# Patient Record
(demographics unavailable — no encounter records)

---

## 2024-12-04 NOTE — HISTORY OF PRESENT ILLNESS
[Worsening] : worsening [6] : an average pain level of 6/10 [Standing] : standing [Daily] : ~He/She~ states the symptoms seem to be occuring daily [Walking] : worsened by walking [Knee Flexion] : worsened with knee flexion [Knee Extension] : worsened with knee extension [Rest] : relieved by rest [de-identified] : 89-year-old female presents with bilateral knees.  She is status post left total knee replacement on 1/3/2024 with great improvement.  She has no complaints with her left knee.  However she has known osteoarthritic changes of the right knee and would like to discuss her options.  She did have a cortisone injection in May which helped until September.  She states her pain has returned and is more so an achiness at this time.  She is having instability in the knee at times.  She is not able to walk or stand for long distances without having significant pain in the right knee.  She would like to discuss her options in terms of the knee replacement surgery.  She is ambulating freely without any assistive devices.. Denies fevers, chills, chest pain, calf pain, shortness of breath.

## 2024-12-04 NOTE — REASON FOR VISIT
[Follow-Up Visit] : a follow-up visit for [FreeTextEntry2] : Left TKR 1/3/24. Right knee osteoarthritis

## 2024-12-04 NOTE — HISTORY OF PRESENT ILLNESS
[Worsening] : worsening [6] : an average pain level of 6/10 [Standing] : standing [Daily] : ~He/She~ states the symptoms seem to be occuring daily [Walking] : worsened by walking [Knee Flexion] : worsened with knee flexion [Knee Extension] : worsened with knee extension [Rest] : relieved by rest [de-identified] : 89-year-old female presents with bilateral knees.  She is status post left total knee replacement on 1/3/2024 with great improvement.  She has no complaints with her left knee.  However she has known osteoarthritic changes of the right knee and would like to discuss her options.  She did have a cortisone injection in May which helped until September.  She states her pain has returned and is more so an achiness at this time.  She is having instability in the knee at times.  She is not able to walk or stand for long distances without having significant pain in the right knee.  She would like to discuss her options in terms of the knee replacement surgery.  She is ambulating freely without any assistive devices.. Denies fevers, chills, chest pain, calf pain, shortness of breath.

## 2024-12-04 NOTE — END OF VISIT
[FreeTextEntry3] : I, Dr. Hall, personally performed the evaluation and management (E/M) services for this established patient who presents today with an exacerbation of an existing condition. That E/M includes conducting the clinically appropriate interval history &/or exam, assessing all new/exacerbated conditions, and establishing a new plan of care. Today, my JAVAN, Teja Mirza PA-C, was here to observe my evaluation and management service for this new problem/exacerbated condition and follow the plan of care established by me going forward.

## 2024-12-04 NOTE — DISCUSSION/SUMMARY
[Medication Risks Reviewed] : Medication risks reviewed [Surgical risks reviewed] : Surgical risks reviewed [de-identified] : The patient is an 89 year old woman with bone on bone arthritis of their Right Knee. Based upon the patients continued symptoms and failure to respond to 3 months of conservative treatment I have recommended a Right Total Knee Replacement for this patient. A long discussion took place with the patient describing what a total joint replacement is and what the procedure would entail. A Total Knee model, similar to the implant that will be used during the operation was utilized to demonstrate and to discuss the various bearing surfaces of the implants.   The benefits of surgery were discussed with the patient including the potential for improving their current clinical condition through operative intervention. Alternatives to surgical intervention including continued conservative management were also discussed in detail.   The hospitalization and post-operative care and rehabilitation were also discussed. The use of perioperative antibiotics and DVT prophylaxis were discussed. The risk, benefits and alternatives to a surgical intervention were discussed at length with the patient. The patient was also advised of risks related to the medical comorbidities and elevated body mass index (BMI). A lengthy discussion took place to review the most common complications including but not limited to: deep vein thrombosis, pulmonary embolus, heart attack, stroke, infection, wound breakdown, numbness, damage to nerves, tendon, muscles, arteries or other blood vessels, death and other possible complications from anesthesia. The patient was told that we will take steps to minimize these risks by using sterile technique, antibiotics and DVT prophylaxis when appropriate and follow the patient postoperatively in the office setting to monitor progress. The possibility of recurrent pain, no improvement in pain and actual worsening of pain were also discussed with the patient.   The discharge plan of care focused on the patient going home following surgery. I encouraged the patient to make the necessary arrangements to have someone stay with them when they are discharged home. Following discharge, a home care nurse will visit the patient. They will open your home care case and request home physical therapy services. Home physical therapy will commence following discharge provided it is appropriate and covered by their] health insurance benefit plan.   All questions were answered to the satisfaction of the patient. The treatment plan of care, as well as a model of a Total Knee equivalent to the one that will be used for their total joint replacement, was shared with the patient. The patient agreed to the plan of care as well as the use of implants in their total joint replacement.   The patient was advised that they will require a medical preoperative risk evaluation by their PCP. Further medical subspecialty clearances such as cardiac may be indicated if felt needed by their PCP.   The patient participated in an interactive discussion of the TKR implant planned for their surgery with questions answered, agreed with the treatment plan, and has decided to move forward with elective TKR as planned.   A DJO Knee implant is the implant I feel comfortable utilizing in my Primary TKRs and the implant I am planning for their TKR. If the patient wishes to utilize a different implant brand/type for their TKR, they may obtain an additional surgical opinion from a Surgeon utilizing that brand implant

## 2024-12-04 NOTE — DISCUSSION/SUMMARY
[Medication Risks Reviewed] : Medication risks reviewed [Surgical risks reviewed] : Surgical risks reviewed [de-identified] : The patient is an 89 year old woman with bone on bone arthritis of their Right Knee. Based upon the patients continued symptoms and failure to respond to 3 months of conservative treatment I have recommended a Right Total Knee Replacement for this patient. A long discussion took place with the patient describing what a total joint replacement is and what the procedure would entail. A Total Knee model, similar to the implant that will be used during the operation was utilized to demonstrate and to discuss the various bearing surfaces of the implants.   The benefits of surgery were discussed with the patient including the potential for improving their current clinical condition through operative intervention. Alternatives to surgical intervention including continued conservative management were also discussed in detail.   The hospitalization and post-operative care and rehabilitation were also discussed. The use of perioperative antibiotics and DVT prophylaxis were discussed. The risk, benefits and alternatives to a surgical intervention were discussed at length with the patient. The patient was also advised of risks related to the medical comorbidities and elevated body mass index (BMI). A lengthy discussion took place to review the most common complications including but not limited to: deep vein thrombosis, pulmonary embolus, heart attack, stroke, infection, wound breakdown, numbness, damage to nerves, tendon, muscles, arteries or other blood vessels, death and other possible complications from anesthesia. The patient was told that we will take steps to minimize these risks by using sterile technique, antibiotics and DVT prophylaxis when appropriate and follow the patient postoperatively in the office setting to monitor progress. The possibility of recurrent pain, no improvement in pain and actual worsening of pain were also discussed with the patient.   The discharge plan of care focused on the patient going home following surgery. I encouraged the patient to make the necessary arrangements to have someone stay with them when they are discharged home. Following discharge, a home care nurse will visit the patient. They will open your home care case and request home physical therapy services. Home physical therapy will commence following discharge provided it is appropriate and covered by their] health insurance benefit plan.   All questions were answered to the satisfaction of the patient. The treatment plan of care, as well as a model of a Total Knee equivalent to the one that will be used for their total joint replacement, was shared with the patient. The patient agreed to the plan of care as well as the use of implants in their total joint replacement.   The patient was advised that they will require a medical preoperative risk evaluation by their PCP. Further medical subspecialty clearances such as cardiac may be indicated if felt needed by their PCP.   The patient participated in an interactive discussion of the TKR implant planned for their surgery with questions answered, agreed with the treatment plan, and has decided to move forward with elective TKR as planned.   A DJO Knee implant is the implant I feel comfortable utilizing in my Primary TKRs and the implant I am planning for their TKR. If the patient wishes to utilize a different implant brand/type for their TKR, they may obtain an additional surgical opinion from a Surgeon utilizing that brand implant

## 2024-12-04 NOTE — PHYSICAL EXAM
[LE] : Sensory: Intact in bilateral lower extremities [DP] : dorsalis pedis 2+ and symmetric bilaterally [Normal RLE] : Right Lower Extremity: No scars, rashes, lesions, ulcers, skin intact [Normal LLE] : Left Lower Extremity: No scars, rashes, lesions, ulcers, skin intact [Normal Touch] : sensation intact for touch [Normal] : no peripheral adenopathy appreciated [de-identified] : B/L Knees: Skin is clean, dry, intact. Swelling: No significant swelling Effusion: No significant effusion Alignment: Neutral alignment Tenderness: None Incision: Well healed surgical incision on the left knee Skin Temp: Warm to touch ROM: Flexion: 120 deg.; Extension: 0 deg, on the left. 110 deg.; Extension: 0 deg, on the right Laxity A-P: Negative anteroposterior drawer Laxity M-L: Less than 5 degree laxity varus valgus stresses PF crepitus: None Sensation intact throughout the distal lower extremity Pulses: 2+ dorsalis pedis pulses Quad/Ham St: 5/5 [de-identified] : 3 views, AP, lateral, and sunrise radiographs of the left knee were performed today. There are stable interfaces between bone, cement, and implant in all 3 components. There is stable positioning of the femoral, tibial, and patellar components. There is equidistant spacing on each side of the tibial bearing. There is no fracture, foreign body, subsidement, osteolysis, or notable effusion. The patellar component is tracking centrally in the trochlear groove of the femoral component.  3 views, AP, lateral, and sunrise radiographs of the right knee were performed today in the [place] office. Grade 4 bone-on-bone cartilaginous wear in all 3 compartments. Subchondral sclerosis noted on both sides the T-F  joint. Osteophytes are noted in all three compartments.

## 2024-12-04 NOTE — PHYSICAL EXAM
[LE] : Sensory: Intact in bilateral lower extremities [DP] : dorsalis pedis 2+ and symmetric bilaterally [Normal RLE] : Right Lower Extremity: No scars, rashes, lesions, ulcers, skin intact [Normal LLE] : Left Lower Extremity: No scars, rashes, lesions, ulcers, skin intact [Normal Touch] : sensation intact for touch [Normal] : no peripheral adenopathy appreciated [de-identified] : B/L Knees: Skin is clean, dry, intact. Swelling: No significant swelling Effusion: No significant effusion Alignment: Neutral alignment Tenderness: None Incision: Well healed surgical incision on the left knee Skin Temp: Warm to touch ROM: Flexion: 120 deg.; Extension: 0 deg, on the left. 110 deg.; Extension: 0 deg, on the right Laxity A-P: Negative anteroposterior drawer Laxity M-L: Less than 5 degree laxity varus valgus stresses PF crepitus: None Sensation intact throughout the distal lower extremity Pulses: 2+ dorsalis pedis pulses Quad/Ham St: 5/5 [de-identified] : 3 views, AP, lateral, and sunrise radiographs of the left knee were performed today. There are stable interfaces between bone, cement, and implant in all 3 components. There is stable positioning of the femoral, tibial, and patellar components. There is equidistant spacing on each side of the tibial bearing. There is no fracture, foreign body, subsidement, osteolysis, or notable effusion. The patellar component is tracking centrally in the trochlear groove of the femoral component.  3 views, AP, lateral, and sunrise radiographs of the right knee were performed today in the [place] office. Grade 4 bone-on-bone cartilaginous wear in all 3 compartments. Subchondral sclerosis noted on both sides the T-F  joint. Osteophytes are noted in all three compartments.

## 2024-12-18 NOTE — HISTORY OF PRESENT ILLNESS
[de-identified] : Patient is a 89 year old F with a PMHx of HLD coming in for annual physical.  Patient reports that she feels well at this time and is currently tolerating statin.  Patient currently up to date with screenings.

## 2024-12-18 NOTE — HISTORY OF PRESENT ILLNESS
[de-identified] : Patient is a 89 year old F with a PMHx of HLD coming in for annual physical.  Patient reports that she feels well at this time and is currently tolerating statin.  Patient currently up to date with screenings.

## 2024-12-18 NOTE — HEALTH RISK ASSESSMENT
[Yes] : Yes [2 - 4 times a month (2 pts)] : 2-4 times a month (2 points) [1 or 2 (0 pts)] : 1 or 2 (0 points) [Never (0 pts)] : Never (0 points) [No] : In the past 12 months have you used drugs other than those required for medical reasons? No [0] : 2) Feeling down, depressed, or hopeless: Not at all (0) [PHQ-2 Negative - No further assessment needed] : PHQ-2 Negative - No further assessment needed [Patient reported mammogram was normal] : Patient reported mammogram was normal [Patient reported PAP Smear was normal] : Patient reported PAP Smear was normal [Patient reported bone density results were normal] : Patient reported bone density results were normal [Patient reported colonoscopy was normal] : Patient reported colonoscopy was normal [Audit-CScore] : 2 [[id number]] : 0 [MammogramDate] : 04/24 [PapSmearDate] : 01/14 [PapSmearComments] : had BSO [BoneDensityDate] : 01/20 [ColonoscopyDate] : 01/23

## 2024-12-18 NOTE — HEALTH RISK ASSESSMENT
[Yes] : Yes [2 - 4 times a month (2 pts)] : 2-4 times a month (2 points) [1 or 2 (0 pts)] : 1 or 2 (0 points) [Never (0 pts)] : Never (0 points) [No] : In the past 12 months have you used drugs other than those required for medical reasons? No [0] : 2) Feeling down, depressed, or hopeless: Not at all (0) [PHQ-2 Negative - No further assessment needed] : PHQ-2 Negative - No further assessment needed [Patient reported mammogram was normal] : Patient reported mammogram was normal [Patient reported PAP Smear was normal] : Patient reported PAP Smear was normal [Patient reported bone density results were normal] : Patient reported bone density results were normal [Patient reported colonoscopy was normal] : Patient reported colonoscopy was normal [Audit-CScore] : 2 [[id number]] : 0 [MammogramDate] : 04/24 [PapSmearDate] : 01/14 [PapSmearComments] : had BSO [BoneDensityDate] : 01/20 [ColonoscopyDate] : 01/23

## 2025-01-15 NOTE — HISTORY OF PRESENT ILLNESS
[No Pertinent Cardiac History] : no history of aortic stenosis, atrial fibrillation, coronary artery disease, recent myocardial infarction, or implantable device/pacemaker [No Pertinent Pulmonary History] : no history of asthma, COPD, sleep apnea, or smoking [No Adverse Anesthesia Reaction] : no adverse anesthesia reaction in self or family member [Moderate (4-6 METs)] : Moderate (4-6 METs) [Chronic Anticoagulation] : no chronic anticoagulation [Chronic Kidney Disease] : no chronic kidney disease [Diabetes] : no diabetes [(Patient denies any chest pain, claudication, dyspnea on exertion, orthopnea, palpitations or syncope)] : Patient denies any chest pain, claudication, dyspnea on exertion, orthopnea, palpitations or syncope [FreeTextEntry1] : TKR or right knee [FreeTextEntry2] : 2/5/25 [FreeTextEntry3] : Dr. Hall [FreeTextEntry4] : Patient is a 89 year old F with a PMHx of HLD coming in for pre op evaluation.  Patient reports increased stress due to death of pet and problems with family but otherwise feels well.

## 2025-02-24 NOTE — HISTORY OF PRESENT ILLNESS
[___ Weeks Post Op] : [unfilled] weeks post op [3] : the patient reports pain that is 3/10 in severity [Clean/Dry/Intact] : clean, dry and intact [Erythema] : erythematous [Swelling] : swollen [Neuro Intact] : an unremarkable neurological exam [Vascular Intact] : ~T peripheral vascular exam normal [Negative Bry's] : maneuvers demonstrated a negative Bry's sign [Xray (Date:___)] : [unfilled] Xray -  [Hardware in Good Position] : hardware in good position [No Obvious Fractures] : no obvious fractures [Good Overall Alignment] : good overall alignment [Doing Well] : is doing well [No Sign of Infection] : is showing no signs of infection [Adequate Pain Control] : has adequate pain control [Steri-Strips Removed & Replaced] : steri-strips removed and replaced [Chills] : no chills [Constipation] : no constipation [Diarrhea] : no diarrhea [Dysuria] : no dysuria [Fever] : no fever [Nausea] : no nausea [Vomiting] : no vomiting [Discharge] : absent of discharge [Dehiscence] : not dehisced [de-identified] : Status post right knee replacement done on 2/5/2025.  Patient is here for her first postop visit. [de-identified] : Alma Gonzalez is a 89-year-old female status post right total knee replacement done on 2/5/2025 by Dr. Ramos at [facility].  Patient was discharged home with home physical therapy.  She she was sent home with oxycodone for pain management.  Patient reports she has not used oxycodone at home.  She is on Celebrex twice a day to reduce inflammation.  She is taking Tylenol every 8 hours for analgesia.  Patient is taking aspirin twice a day for DVT prophylaxis.  Patient reports her pain is 3 out of 10.  Denies any fever chills shortness of breath nausea or vomiting.  Reports lower extremity and ankle swelling that resolves after elevation and rest.  Patient also reports she has been experiencing some muscle aches throughout her body.  Patient reports this is something new after her surgery. [de-identified] : Patient is alert oriented x 3 walks and using a walker with mild antalgic and slow gait.  Right knee incision clean dry and intact with Prineo tape in place.  Tape was removed to visualize the incision.  Incision is well-healing and approximated.  There is no dehiscence noted.  There is medial swelling compared to the lateral side.  There is no redness or signs and symptoms of infection or effusion.  Knee flexes to 100 degrees without any pain.  There is  no extension lag.  There is no AP or ML laxity.  Negative Homans' sign neurovascularly intact.  Mild lower extremity edema and good pedal pulses. [de-identified] : 3 views of the right knee were obtained at today's visit. X-rays reveal a well-positioned, well fixed total knee replacement in good alignment. There is no obvious evidence of fractures, dislocation or osteolysis. [de-identified] : S/P right knee replacement . Pt is healing well. [de-identified] : Patient is a 89-year-old female status post right knee replacement done on 2/5/2025 at [facility].  Patient is progressing well postoperatively.  X-ray results and physical examination findings discussed with the patient.  Patient to continue with icing resting and activity modification as discussed.  She would prefer to continue home therapy for the next 2 weeks and then start outpatient physical therapy. Request for both inpatient and outpatient therapy given to patient.  Patient to continue aspirin twice a day for the next 2 weeks for DVT prophylaxis.  She will continue with the Celebrex and Tylenol as directed.  Discussed Dr. Ramos's dental prophylaxis protocol and a prescription for amoxicillin was sent to pharmacy.  Discussed the muscle pain that she is experiencing throughout her body since the surgery.  Upon questioning patient is on a statin.  She has been on atorvastatin 10 mg/day for over a year.  But recently in the last 2 weeks it was increased to 20 mg/day.  Advised her symptoms could be from the statin, and to follow-up with her primary physician ASAP to discuss the symptoms.  Explained it is also important that she will check her electrolytes and stay hydrated.  Patient to follow-up with PMD ASAP.  All her questions and concerns were answered to her satisfaction.  Patient to follow-up with Dr. Ramos in 6 weeks. My cumulative time spent on this patient's visit included: Preparation for the visit, review of the medical records, review of pertinent diagnostic studies, examination and counseling of the patient on the above diagnosis, treatment plan and prognosis, orders of diagnostic tests, medications and/or appropriate procedures and documentation in the medical records of today's visit

## 2025-02-24 NOTE — HISTORY OF PRESENT ILLNESS
[___ Weeks Post Op] : [unfilled] weeks post op [3] : the patient reports pain that is 3/10 in severity [Clean/Dry/Intact] : clean, dry and intact [Erythema] : erythematous [Swelling] : swollen [Neuro Intact] : an unremarkable neurological exam [Vascular Intact] : ~T peripheral vascular exam normal [Negative Bry's] : maneuvers demonstrated a negative Bry's sign [Xray (Date:___)] : [unfilled] Xray -  [Hardware in Good Position] : hardware in good position [No Obvious Fractures] : no obvious fractures [Good Overall Alignment] : good overall alignment [Doing Well] : is doing well [No Sign of Infection] : is showing no signs of infection [Adequate Pain Control] : has adequate pain control [Steri-Strips Removed & Replaced] : steri-strips removed and replaced [Chills] : no chills [Constipation] : no constipation [Diarrhea] : no diarrhea [Dysuria] : no dysuria [Fever] : no fever [Nausea] : no nausea [Vomiting] : no vomiting [Discharge] : absent of discharge [Dehiscence] : not dehisced [de-identified] : Status post right knee replacement done on 2/5/2025.  Patient is here for her first postop visit. [de-identified] : Alma Gonzalez is a 89-year-old female status post right total knee replacement done on 2/5/2025 by Dr. Ramos at [facility].  Patient was discharged home with home physical therapy.  She she was sent home with oxycodone for pain management.  Patient reports she has not used oxycodone at home.  She is on Celebrex twice a day to reduce inflammation.  She is taking Tylenol every 8 hours for analgesia.  Patient is taking aspirin twice a day for DVT prophylaxis.  Patient reports her pain is 3 out of 10.  Denies any fever chills shortness of breath nausea or vomiting.  Reports lower extremity and ankle swelling that resolves after elevation and rest.  Patient also reports she has been experiencing some muscle aches throughout her body.  Patient reports this is something new after her surgery. [de-identified] : Patient is alert oriented x 3 walks and using a walker with mild antalgic and slow gait.  Right knee incision clean dry and intact with Prineo tape in place.  Tape was removed to visualize the incision.  Incision is well-healing and approximated.  There is no dehiscence noted.  There is medial swelling compared to the lateral side.  There is no redness or signs and symptoms of infection or effusion.  Knee flexes to 100 degrees without any pain.  There is  no extension lag.  There is no AP or ML laxity.  Negative Homans' sign neurovascularly intact.  Mild lower extremity edema and good pedal pulses. [de-identified] : 3 views of the right knee were obtained at today's visit. X-rays reveal a well-positioned, well fixed total knee replacement in good alignment. There is no obvious evidence of fractures, dislocation or osteolysis. [de-identified] : S/P right knee replacement . Pt is healing well. [de-identified] : Patient is a 89-year-old female status post right knee replacement done on 2/5/2025 at [facility].  Patient is progressing well postoperatively.  X-ray results and physical examination findings discussed with the patient.  Patient to continue with icing resting and activity modification as discussed.  She would prefer to continue home therapy for the next 2 weeks and then start outpatient physical therapy. Request for both inpatient and outpatient therapy given to patient.  Patient to continue aspirin twice a day for the next 2 weeks for DVT prophylaxis.  She will continue with the Celebrex and Tylenol as directed.  Discussed Dr. Ramos's dental prophylaxis protocol and a prescription for amoxicillin was sent to pharmacy.  Discussed the muscle pain that she is experiencing throughout her body since the surgery.  Upon questioning patient is on a statin.  She has been on atorvastatin 10 mg/day for over a year.  But recently in the last 2 weeks it was increased to 20 mg/day.  Advised her symptoms could be from the statin, and to follow-up with her primary physician ASAP to discuss the symptoms.  Explained it is also important that she will check her electrolytes and stay hydrated.  Patient to follow-up with PMD ASAP.  All her questions and concerns were answered to her satisfaction.  Patient to follow-up with Dr. Ramos in 6 weeks. My cumulative time spent on this patient's visit included: Preparation for the visit, review of the medical records, review of pertinent diagnostic studies, examination and counseling of the patient on the above diagnosis, treatment plan and prognosis, orders of diagnostic tests, medications and/or appropriate procedures and documentation in the medical records of today's visit

## 2025-04-08 NOTE — HISTORY OF PRESENT ILLNESS
[Chills] : no chills [Fever] : no fever [de-identified] : Right TKR 2/5/252 [de-identified] : Patient is happy with the progress she is making with physical therapy. On her first week after surgery there was an incident of skin tears with the removal of the post operative surgical dressing. Was immediately addressed with no scarring nor any complication of further irritation. [de-identified] : Normal post operative swelling of the right total knee replacement. Surrounding skin is intact with no scarring or erythema, well healed. Full extension of 0 degrees and flexion to 120 degrees. No instability is noted on varus nor valgus stressing Neutral alignment of the right TKR [de-identified] : Radiographs 3 views obtained of the right total knee replacement were performed today. There are stable interfaces between bone and implants of the femur, tibia and patella. Alignment of the femur with the tibia are good, and the alignment of the patella to the femoral groove are well aligned. There is no obvious fracture,.  [de-identified] : Continue with Physical Therapy. Follow up a year after the surgery.    I, Dr. Maura Hall, personally performed the evaluation and management (E/M) services for this new patient.  That E/M includes conducting the initial examination, assessing all conditions, and establishing a plan of care.  Today, my ACP, Mary Billings, was here to observe my evaluation and management services for this patient to be followed going forward.

## 2025-04-08 NOTE — HISTORY OF PRESENT ILLNESS
[Chills] : no chills [Fever] : no fever [de-identified] : Right TKR 2/5/252 [de-identified] : Patient is happy with the progress she is making with physical therapy. On her first week after surgery there was an incident of skin tears with the removal of the post operative surgical dressing. Was immediately addressed with no scarring nor any complication of further irritation. [de-identified] : Normal post operative swelling of the right total knee replacement. Surrounding skin is intact with no scarring or erythema, well healed. Full extension of 0 degrees and flexion to 120 degrees. No instability is noted on varus nor valgus stressing Neutral alignment of the right TKR [de-identified] : Radiographs 3 views obtained of the right total knee replacement were performed today. There are stable interfaces between bone and implants of the femur, tibia and patella. Alignment of the femur with the tibia are good, and the alignment of the patella to the femoral groove are well aligned. There is no obvious fracture,.  [de-identified] : Continue with Physical Therapy. Follow up a year after the surgery.    I, Dr. Maura Hall, personally performed the evaluation and management (E/M) services for this new patient.  That E/M includes conducting the initial examination, assessing all conditions, and establishing a plan of care.  Today, my ACP, Mary Billings, was here to observe my evaluation and management services for this patient to be followed going forward.

## 2025-04-23 NOTE — HISTORY OF PRESENT ILLNESS
[de-identified] : Patient is a 90 year old F with a PMHx of HLD coming in for follow up.  Patient reports she is s/p knee surgery and, has been feeling well.  Patient reports she decreased vitamin D intake.  Patient reports that she is still taking statin but not daily.